# Patient Record
Sex: MALE | Employment: OTHER | URBAN - METROPOLITAN AREA
[De-identification: names, ages, dates, MRNs, and addresses within clinical notes are randomized per-mention and may not be internally consistent; named-entity substitution may affect disease eponyms.]

---

## 2019-09-09 ENCOUNTER — OFFICE VISIT (OUTPATIENT)
Dept: DERMATOLOGY | Facility: CLINIC | Age: 79
End: 2019-09-09
Payer: COMMERCIAL

## 2019-09-09 DIAGNOSIS — C43.9 MALIGNANT MELANOMA, UNSPECIFIED SITE (H): Primary | ICD-10-CM

## 2019-09-09 PROCEDURE — 99212 OFFICE O/P EST SF 10 MIN: CPT | Performed by: DERMATOLOGY

## 2019-09-09 ASSESSMENT — PAIN SCALES - GENERAL: PAINLEVEL: NO PAIN (0)

## 2019-09-09 NOTE — PROGRESS NOTES
Visit Date:   2019      SUBJECTIVE:  Jeff comes in with his daughter, Jamil Walls, and his wife because of the fact that within the last month he was diagnosed as having a melanoma on his right upper back/shoulder. Lesion was excised at a clinic near [place].  It was determined to be a 6 mm in depth  melanoma thickness wise, therefore a level 4 and of concern because of the depth. His daughter, Jamil, called me because I have been her dermatologist through the past and also did see Jeff at one time at the  of .  At any rate, I suggested that he come in to see me to line up and oncologic surgeon.      This morning before seeing me, they met with Dr. Ailyn Montez, a surgeon in [place], who is an oncologic surgeon. She suggested another excision and a sentinel node biopsy.  The family was impressed with her visit and  confident in her and would like to stay with her.     I recommended that Dr Montez perform the surgery but also recommended, that Jeff continue to come to our clinic here at [facility] to see Dr. Peggy James for post surgical  melanoma management.  They said that they would do that.      OBJECTIVE:  On exam today, I examined his back, face and chest only.  On the right upper back was a site of surgery, well-healed from the melanoma excision.  He had only seborrheic keratoses and other nevi, but nothing suggesting actinic keratosis on the exam.      PLAN:  As above, I recommended that they follow through with  Dr. Montez  and that they then follow up with  Dr. James.       I advised that they keep in touch with me should there be any questions.     MEDICATIONS AND ALLERGIES:  Reviewed.      Return pending the above situation as outlined.         MURIEL FOREMAN MD             D: 2019   T: 2019   MT: NELLA      Name:     JEFF COUCH   MRN:      [id number]        Account:      [id number]   :      1940           Visit Date:   2019      Document:  [id number]

## 2019-09-09 NOTE — LETTER
9/9/2019         RE: Stephan Culp Jr.  [address] [facility] [id number]        Dear Colleague,    Thank you for referring your patient, Stephan Culp Jr., to the [facility]. Please see a copy of my visit note below.    Visit Date:   09/09/2019      SUBJECTIVE:  Stephan comes in with his daughter, Jamil Walls, and his wife because of the fact that within the last month he was diagnosed as having a melanoma on his right upper back/shoulder. Lesion was excised at a clinic near [place].  It was determined to be a 6 mm in depth  melanoma thickness wise, therefore a level 4 and of concern because of the depth. His daughter, Jamil, called me because I have been her dermatologist through the past and also did see Stephan at one time at the [facility].  At any rate, I suggested that he come in to see me to line up and oncologic surgeon.      This morning before seeing me, they met with Dr. Ailyn Montez, a surgeon in [place], who is an oncologic surgeon. She suggested another excision and a sentinel node biopsy.  The family was impressed with her visit and  confident in her and would like to stay with her.     I recommended that Dr Montez perform the surgery but also recommended, that Stephan continue to come to our clinic here at [facility] to see Dr. Peggy James for post surgical  melanoma management.  They said that they would do that.      OBJECTIVE:  On exam today, I examined his back, face and chest only.  On the right upper back was a site of surgery, well-healed from the melanoma excision.  He had only seborrheic keratoses and other nevi, but nothing suggesting actinic keratosis on the exam.      PLAN:  As above, I recommended that they follow through with  Dr. Montez  and that they then follow up with  Dr. James.       I advised that they keep in touch with me should there be any questions.     MEDICATIONS AND ALLERGIES:  Reviewed.      Return pending the above  situation as outlined.         MURIEL FOREMAN MD             D: 2019   T: 2019   MT: NELLA      Name:     JEFF COUCH   MRN:      [id number]        Account:      [id number]   :      1940           Visit Date:   2019      Document: [id number]        Again, thank you for allowing me to participate in the care of your patient.        Sincerely,        MURIEL Foreman MD

## 2019-09-09 NOTE — NURSING NOTE
Stephan Culp Jr.'s goals for this visit include:   Chief Complaint   Patient presents with     Derm Problem     melanoma consult       He requests these members of his care team be copied on today's visit information: Yes    PCP: No primary care provider on file.    Referring Provider:  No referring provider defined for this encounter.    There were no vitals taken for this visit.    Do you need any medication refills at today's visit? No    Marta Casper LPN